# Patient Record
Sex: MALE | Race: BLACK OR AFRICAN AMERICAN | NOT HISPANIC OR LATINO | Employment: FULL TIME | URBAN - METROPOLITAN AREA
[De-identification: names, ages, dates, MRNs, and addresses within clinical notes are randomized per-mention and may not be internally consistent; named-entity substitution may affect disease eponyms.]

---

## 2023-06-07 ENCOUNTER — OFFICE VISIT (OUTPATIENT)
Dept: PRIMARY CARE | Facility: CLINIC | Age: 28
End: 2023-06-07
Payer: MEDICARE

## 2023-06-07 VITALS
OXYGEN SATURATION: 98 % | RESPIRATION RATE: 16 BRPM | HEART RATE: 60 BPM | BODY MASS INDEX: 25.11 KG/M2 | WEIGHT: 175 LBS | DIASTOLIC BLOOD PRESSURE: 76 MMHG | SYSTOLIC BLOOD PRESSURE: 115 MMHG | TEMPERATURE: 97.4 F

## 2023-06-07 DIAGNOSIS — H61.22 IMPACTED CERUMEN OF LEFT EAR: Primary | ICD-10-CM

## 2023-06-07 PROBLEM — S82.142A CLOSED FRACTURE OF LEFT TIBIAL PLATEAU: Status: ACTIVE | Noted: 2023-06-07

## 2023-06-07 PROBLEM — S42.202A CLOSED FRACTURE OF LEFT PROXIMAL HUMERUS: Status: ACTIVE | Noted: 2023-06-07

## 2023-06-07 PROBLEM — M25.519 SHOULDER PAIN: Status: ACTIVE | Noted: 2023-06-07

## 2023-06-07 PROBLEM — L23.7 ALLERGIC DERMATITIS DUE TO POISON IVY: Status: RESOLVED | Noted: 2023-06-07 | Resolved: 2023-06-07

## 2023-06-07 PROBLEM — S82.402A LEFT FIBULAR FRACTURE: Status: ACTIVE | Noted: 2023-06-07

## 2023-06-07 PROBLEM — E04.2 MULTIPLE THYROID NODULES: Status: ACTIVE | Noted: 2023-06-07

## 2023-06-07 PROBLEM — S43.005A DISLOCATION OF LEFT SHOULDER JOINT: Status: ACTIVE | Noted: 2023-06-07

## 2023-06-07 PROBLEM — S42.302D: Status: ACTIVE | Noted: 2023-06-07

## 2023-06-07 PROCEDURE — 69210 REMOVE IMPACTED EAR WAX UNI: CPT | Performed by: NURSE PRACTITIONER

## 2023-06-07 PROCEDURE — 1036F TOBACCO NON-USER: CPT | Performed by: NURSE PRACTITIONER

## 2023-06-07 PROCEDURE — 99213 OFFICE O/P EST LOW 20 MIN: CPT | Performed by: NURSE PRACTITIONER

## 2023-06-07 ASSESSMENT — ENCOUNTER SYMPTOMS
HEADACHES: 0
DIARRHEA: 0
GASTROINTESTINAL NEGATIVE: 1
RHINORRHEA: 0
EYES NEGATIVE: 1
RESPIRATORY NEGATIVE: 1
VOMITING: 0
NEUROLOGICAL NEGATIVE: 1
NECK PAIN: 0
CARDIOVASCULAR NEGATIVE: 1
CONSTITUTIONAL NEGATIVE: 1
ABDOMINAL PAIN: 0
MUSCULOSKELETAL NEGATIVE: 1
ENDOCRINE NEGATIVE: 1
ALLERGIC/IMMUNOLOGIC NEGATIVE: 1
HEMATOLOGIC/LYMPHATIC NEGATIVE: 1
SORE THROAT: 0
COUGH: 0
PSYCHIATRIC NEGATIVE: 1

## 2023-06-07 NOTE — ASSESSMENT & PLAN NOTE
Patient tolerated ear irrigation well.  Patient denied lightheaded nor dizziness after procedure.  Patient to avoid using qtips and ear pods in the ears.  Patient will contact office if left ear pain begins in next 3 to 4 days.

## 2023-06-07 NOTE — PATIENT INSTRUCTIONS
Patient was seen and examined.  Diagnosis, treatment, and possible complications of today's illness discussed and explained to patient.  Patient to avoid using qtips and ear pods in the ears.   Patient may also take OTC analgesic/antipyretic if needed for pain/fever.  Advised to increase oral fluid intake as tolerated if no nausea or vomiting.  Patient educated and advised to come back or call if worsening or persistent symptoms. Patient educated on when to seek urgent/emergent care. Patient was given opportunity to ask questions and denied any at this time.  Patient verbalized understanding and agrees with plan of care.

## 2023-06-07 NOTE — PROGRESS NOTES
Subjective   Patient ID: Richard Camarena is a 28 y.o. male who presents for Earache.  Patient presents to convenient care appointment with complaint of left ear pain since yesterday.  Patient's girlfriend cleaned his ears with q-tip and patient reports pain and muffled hearing.  Patient had ear cleaned 1 year ago with some relief. Patient has not been taking any OTC medications for relief.  Patient denies chest pain, shortness of breath, nausea, vomiting nor diarrhea.       Earache   There is pain in the left ear. This is a new problem. The current episode started yesterday. The problem occurs constantly. The problem has been unchanged. There has been no fever. Pertinent negatives include no abdominal pain, coughing, diarrhea, ear discharge, headaches, hearing loss, neck pain, rash, rhinorrhea, sore throat or vomiting. He has tried nothing for the symptoms.       Review of Systems   Constitutional: Negative.    HENT:  Positive for ear pain. Negative for ear discharge, hearing loss, rhinorrhea and sore throat.    Eyes: Negative.    Respiratory: Negative.  Negative for cough.    Cardiovascular: Negative.    Gastrointestinal: Negative.  Negative for abdominal pain, diarrhea and vomiting.   Endocrine: Negative.    Genitourinary: Negative.    Musculoskeletal: Negative.  Negative for neck pain.   Skin: Negative.  Negative for rash.   Allergic/Immunologic: Negative.    Neurological: Negative.  Negative for headaches.   Hematological: Negative.    Psychiatric/Behavioral: Negative.     All other systems reviewed and are negative.      /76   Pulse 60   Temp 36.3 °C (97.4 °F)   Resp 16   Wt 79.4 kg (175 lb)   SpO2 98%   BMI 25.11 kg/m²     Objective   Physical Exam  Vitals and nursing note reviewed.   Constitutional:       Appearance: Normal appearance.   HENT:      Head: Normocephalic and atraumatic.      Right Ear: Tympanic membrane, ear canal and external ear normal.      Left Ear: Tympanic membrane and external  ear normal. There is impacted cerumen.      Ears:      Comments: After ear irrigation, bilateral ear canals, TMs are normal and intact. No erythema noted in bilateral ears.      Nose: Nose normal.      Mouth/Throat:      Mouth: Mucous membranes are moist.      Pharynx: Oropharynx is clear.   Eyes:      Extraocular Movements: Extraocular movements intact.      Conjunctiva/sclera: Conjunctivae normal.      Pupils: Pupils are equal, round, and reactive to light.   Cardiovascular:      Rate and Rhythm: Normal rate and regular rhythm.      Pulses: Normal pulses.      Heart sounds: Normal heart sounds.   Pulmonary:      Effort: Pulmonary effort is normal.      Breath sounds: No wheezing, rhonchi or rales.   Musculoskeletal:         General: Normal range of motion.      Cervical back: Normal range of motion and neck supple.   Skin:     General: Skin is warm and dry.      Capillary Refill: Capillary refill takes less than 2 seconds.   Neurological:      General: No focal deficit present.      Mental Status: He is alert and oriented to person, place, and time.   Psychiatric:         Mood and Affect: Mood normal.         Behavior: Behavior normal.         Assessment/Plan   Problem List Items Addressed This Visit          Other    Impacted cerumen of left ear - Primary     Patient tolerated ear irrigation well.  Patient denied lightheaded nor dizziness after procedure.  Patient to avoid using qtips and ear pods in the ears.  Patient will contact office if left ear pain begins in next 3 to 4 days.

## 2023-06-07 NOTE — PROGRESS NOTES
Patient's PCP is Barry Naranjo MD        Symptoms:   Headache, Sinus pressure on forehead and under eyes, Sneezing, Fatigue,    Length of Symptoms: Yesterday 06.06.2023   Denies: Fever, Runny nose, Congestion, Post Nasal Drainage, Dry/Productive Cough, LEFT Ear ache 9/10 , Sore throat, SOB, Wheezing, Nausea, Diarrhea, Vomiting, Chills, Body aches,   Factors that effect symptoms: NONE     --Related Information--

## 2024-08-05 ENCOUNTER — TELEPHONE (OUTPATIENT)
Dept: PRIMARY CARE | Facility: CLINIC | Age: 29
End: 2024-08-05
Payer: MEDICARE

## 2024-08-05 NOTE — TELEPHONE ENCOUNTER
The patients grandmother has phoned to request an appointment for the patient to see the doctor for concerning growth/ lump(s) under the patients armpits.  The Grandmother stressed that he do not wish to visit the ER, nor does he want to visit the [facility] unless he absolutely has to.  I have informed Mrs. Ceron that it would be of Dr. Naranjo's discretion as to whether he will see the patient and when.   Someone from our office will give her a call with an answer.  Please call Mrs. Ceron at [phone].  Thank you.

## 2024-08-06 PROBLEM — S12.9XXA: Status: ACTIVE | Noted: 2017-04-21

## 2024-08-06 PROBLEM — S27.321A CONTUSION OF LEFT LUNG: Status: ACTIVE | Noted: 2017-04-20

## 2024-08-07 NOTE — TELEPHONE ENCOUNTER
Left message to call back  -- If/when Yudith is to come in on 08/08 please switch to the 345pm -- appointment was supposed to be for today 08/07 @130p, but I told her the 08/08 @130p and we can not do another 130p -- apologizes